# Patient Record
Sex: MALE | Race: WHITE | NOT HISPANIC OR LATINO | Employment: OTHER | URBAN - METROPOLITAN AREA
[De-identification: names, ages, dates, MRNs, and addresses within clinical notes are randomized per-mention and may not be internally consistent; named-entity substitution may affect disease eponyms.]

---

## 2023-09-11 PROBLEM — D89.2 HYPERGAMMAGLOBULINEMIA: Status: ACTIVE | Noted: 2023-09-11

## 2023-09-11 PROBLEM — I10 HYPERTENSION: Status: ACTIVE | Noted: 2023-09-11

## 2023-09-11 PROBLEM — E78.5 HYPERLIPIDEMIA: Status: ACTIVE | Noted: 2023-09-11

## 2023-09-11 PROBLEM — R73.02 IMPAIRED GLUCOSE TOLERANCE: Status: ACTIVE | Noted: 2023-09-11

## 2023-09-11 RX ORDER — BRIMONIDINE TARTRATE AND TIMOLOL MALEATE 2; 5 MG/ML; MG/ML
1 SOLUTION OPHTHALMIC 2 TIMES DAILY
COMMUNITY

## 2023-09-11 RX ORDER — FLUTICASONE PROPIONATE 50 MCG
1 SPRAY, SUSPENSION (ML) NASAL DAILY
COMMUNITY
Start: 2022-05-27 | End: 2024-06-07 | Stop reason: ALTCHOICE

## 2023-09-11 RX ORDER — PRAVASTATIN SODIUM 40 MG/1
1 TABLET ORAL DAILY
COMMUNITY
Start: 2018-08-07 | End: 2024-05-20

## 2023-09-11 RX ORDER — LATANOPROST 50 UG/ML
1 SOLUTION/ DROPS OPHTHALMIC EVERY EVENING
COMMUNITY

## 2023-09-11 RX ORDER — LISINOPRIL 5 MG/1
1 TABLET ORAL DAILY
COMMUNITY
Start: 2018-08-06 | End: 2024-05-20

## 2024-05-18 DIAGNOSIS — I10 HYPERTENSION, UNSPECIFIED TYPE: ICD-10-CM

## 2024-05-18 DIAGNOSIS — E78.49 OTHER HYPERLIPIDEMIA: ICD-10-CM

## 2024-05-20 RX ORDER — PRAVASTATIN SODIUM 40 MG/1
40 TABLET ORAL DAILY
Qty: 90 TABLET | Refills: 3 | Status: SHIPPED | OUTPATIENT
Start: 2024-05-20

## 2024-05-20 RX ORDER — LISINOPRIL 5 MG/1
5 TABLET ORAL DAILY
Qty: 90 TABLET | Refills: 3 | Status: SHIPPED | OUTPATIENT
Start: 2024-05-20

## 2024-06-05 ENCOUNTER — LAB (OUTPATIENT)
Dept: LAB | Facility: LAB | Age: 69
End: 2024-06-05
Payer: MEDICARE

## 2024-06-05 DIAGNOSIS — Z12.5 ENCOUNTER FOR SCREENING FOR MALIGNANT NEOPLASM OF PROSTATE: ICD-10-CM

## 2024-06-05 DIAGNOSIS — R73.9 HYPERGLYCEMIA, UNSPECIFIED: Primary | ICD-10-CM

## 2024-06-05 DIAGNOSIS — I10 ESSENTIAL (PRIMARY) HYPERTENSION: ICD-10-CM

## 2024-06-05 LAB
ALBUMIN SERPL-MCNC: 4.5 G/DL (ref 3.5–5)
ALP BLD-CCNC: 92 U/L (ref 35–125)
ALT SERPL-CCNC: 26 U/L (ref 5–40)
ANION GAP SERPL CALC-SCNC: 9 MMOL/L
AST SERPL-CCNC: 22 U/L (ref 5–40)
BILIRUB SERPL-MCNC: 1.1 MG/DL (ref 0.1–1.2)
BUN SERPL-MCNC: 14 MG/DL (ref 8–25)
CALCIUM SERPL-MCNC: 9.6 MG/DL (ref 8.5–10.4)
CHLORIDE SERPL-SCNC: 99 MMOL/L (ref 97–107)
CO2 SERPL-SCNC: 27 MMOL/L (ref 24–31)
CREAT SERPL-MCNC: 1 MG/DL (ref 0.4–1.6)
EGFRCR SERPLBLD CKD-EPI 2021: 80 ML/MIN/1.73M*2
ERYTHROCYTE [DISTWIDTH] IN BLOOD BY AUTOMATED COUNT: 12.4 % (ref 11.5–14.5)
EST. AVERAGE GLUCOSE BLD GHB EST-MCNC: 128 MG/DL
GLUCOSE SERPL-MCNC: 132 MG/DL (ref 65–99)
HBA1C MFR BLD: 6.1 %
HCT VFR BLD AUTO: 48.3 % (ref 41–52)
HGB BLD-MCNC: 16.1 G/DL (ref 13.5–17.5)
MCH RBC QN AUTO: 31 PG (ref 26–34)
MCHC RBC AUTO-ENTMCNC: 33.3 G/DL (ref 32–36)
MCV RBC AUTO: 93 FL (ref 80–100)
NRBC BLD-RTO: 0 /100 WBCS (ref 0–0)
PLATELET # BLD AUTO: 238 X10*3/UL (ref 150–450)
POTASSIUM SERPL-SCNC: 5.2 MMOL/L (ref 3.4–5.1)
PROT SERPL-MCNC: 7.4 G/DL (ref 5.9–7.9)
PSA SERPL-MCNC: 2.8 NG/ML
RBC # BLD AUTO: 5.19 X10*6/UL (ref 4.5–5.9)
SODIUM SERPL-SCNC: 135 MMOL/L (ref 133–145)
WBC # BLD AUTO: 6.1 X10*3/UL (ref 4.4–11.3)

## 2024-06-05 PROCEDURE — 80053 COMPREHEN METABOLIC PANEL: CPT

## 2024-06-05 PROCEDURE — G0103 PSA SCREENING: HCPCS

## 2024-06-05 PROCEDURE — 85027 COMPLETE CBC AUTOMATED: CPT

## 2024-06-05 PROCEDURE — 83036 HEMOGLOBIN GLYCOSYLATED A1C: CPT

## 2024-06-05 PROCEDURE — 36415 COLL VENOUS BLD VENIPUNCTURE: CPT

## 2024-06-06 NOTE — PROGRESS NOTES
[facility]: [facility]  MEDICARE WELLNESS EXAM      Michi Galindo is a 69 y.o. male that is presenting today for CPE.    Assessment/Plan    Diagnoses and all orders for this visit:  Annual physical exam  Screening for prostate cancer  Encounter for screening for malignant neoplasm of colon  Mixed hyperlipidemia  -     Comprehensive Metabolic Panel; Future  -     Lipid Panel; Future  Primary hypertension  Prediabetes  Encounter for routine laboratory testing  -     Hemoglobin A1C; Future  Other orders  -     Follow Up In Primary Care - Established; Future  We completed the medicare wellness exam today.  The lifestyle is reviewed and recommendations for diet and exercise are made. We reviewed the immunizations and cancer screening and recommendations for needed immunizations and screenings are made.  The patient is independent in all ADL, shows no clinical signs of cognitive impairment, and is not falling or at risk for such.     We updated his chronic doctors and indicated such in the Epic care team section.    Some more specifics in terms of the screenings.  He had a colonoscopy in 2022 with his next one due in 2025.  His PSA was recently normal.  He had Prevnar 20 in 2018 Tdap in 2021 and Shingrix x 2 in 2022.  It would appear that all these things are currently up-to-date.    We also had an opportunity to evaluate/manage his chronic medical problems.  Blood pressure and cholesterol are very well-controlled on his current dose of medications and he will continue those as is.  We discussed his prediabetes today and I am concerned that his sugar is going up to 132 although his A1c is still in the prediabetes range.  We we discussed this in great detail today so as to hopefully prevent him from having further escalations I will plan on seeing him back in 6 months in that regard to recheck on those numbers.    ADVANCED CARE PLANNING  Advanced Care Planning was discussed with patient:  The  patient has an active advanced care plan on file. The patient has an active surrogate decision-maker on file.  Encouraged the patient to confirm that Living Will and Healthcare Power of  (HCPoA) are accurate and up to date.  Encouraged the patient to confirm that our office be provided a copy of any documentation in the event that anything changes.    ACTIVITIES OF DAILY LIVING  Basic ADLs:  Bathing: Independent, Dressing: Independent, Toileting: Independent, Transferring: Independent, Continence: Independent, Feeding: Independent.    Instrumental ADLs:  Ability to use phone: Independent, Shopping: Independent, Cooking: Independent, House-keeping: Independent, Laundry: Independent, Transportation: Independent, Medication Management: Independent, Finance Management: Independent.    Subjective   Here for AWV and follow up of his chronic medical issues- pt feeling very well right now - last seen 1 year ago      Review of Systems   Constitutional: Negative.    HENT: Negative.     Eyes: Negative.    Respiratory: Negative.     Cardiovascular: Negative.    Gastrointestinal: Negative.    Endocrine: Negative.    Genitourinary: Negative.    Musculoskeletal: Negative.    Skin: Negative.    Allergic/Immunologic: Negative.    Neurological: Negative.    Hematological: Negative.    Psychiatric/Behavioral: Negative.     All other systems reviewed and are negative.    Objective   Vitals:    06/07/24 1027   BP: 126/86   Pulse: 72   Temp: 36.1 °C (97 °F)   SpO2: 99%      Body mass index is 32.75 kg/m².  Physical Exam  Vitals and nursing note reviewed.   Constitutional:       General: He is not in acute distress.     Appearance: Normal appearance. He is not ill-appearing.   HENT:      Head: Normocephalic and atraumatic.      Right Ear: Hearing, tympanic membrane, ear canal and external ear normal. There is no impacted cerumen.      Left Ear: Hearing, tympanic membrane, ear canal and external ear normal. There is no impacted  cerumen.      Nose: Nose normal.      Mouth/Throat:      Mouth: Mucous membranes are moist.      Pharynx: Oropharynx is clear. No oropharyngeal exudate or posterior oropharyngeal erythema.   Eyes:      General: No scleral icterus.        Right eye: No discharge.         Left eye: No discharge.      Extraocular Movements: Extraocular movements intact.      Conjunctiva/sclera: Conjunctivae normal.      Pupils: Pupils are equal, round, and reactive to light.   Neck:      Vascular: No carotid bruit.   Cardiovascular:      Rate and Rhythm: Normal rate and regular rhythm.      Pulses: Normal pulses.      Heart sounds: Normal heart sounds. No murmur heard.     No friction rub. No gallop.   Pulmonary:      Effort: Pulmonary effort is normal. No respiratory distress.      Breath sounds: Normal breath sounds.   Abdominal:      General: Abdomen is flat. Bowel sounds are normal.      Palpations: Abdomen is soft.      Tenderness: There is no abdominal tenderness.      Hernia: No hernia is present.   Musculoskeletal:         General: No swelling. Normal range of motion.      Cervical back: Normal range of motion.   Lymphadenopathy:      Cervical: No cervical adenopathy.   Skin:     General: Skin is warm and dry.      Coloration: Skin is not jaundiced.      Findings: No rash.   Neurological:      General: No focal deficit present.      Mental Status: He is alert and oriented to person, place, and time. Mental status is at baseline.   Psychiatric:         Mood and Affect: Mood normal.         Behavior: Behavior normal.       Diagnostic Results   Lab Results   Component Value Date    GLUCOSE 132 (H) 06/05/2024    CALCIUM 9.6 06/05/2024     06/05/2024    K 5.2 (H) 06/05/2024    CO2 27 06/05/2024    CL 99 06/05/2024    BUN 14 06/05/2024    CREATININE 1.00 06/05/2024     Lab Results   Component Value Date    ALT 26 06/05/2024    AST 22 06/05/2024    ALKPHOS 92 06/05/2024    BILITOT 1.1 06/05/2024     Lab Results   Component Value  "Date    WBC 6.1 06/05/2024    HGB 16.1 06/05/2024    HCT 48.3 06/05/2024    MCV 93 06/05/2024     06/05/2024     Lab Results   Component Value Date    CHOL 159 05/25/2023    CHOL 177 05/23/2022    CHOL 170 05/07/2021     Lab Results   Component Value Date    HDL 35 (L) 05/25/2023    HDL 44 05/23/2022    HDL 48 05/07/2021     Lab Results   Component Value Date    LDLCALC 98 05/25/2023    LDLCALC 104 05/23/2022    LDLCALC 89 05/07/2021     Lab Results   Component Value Date    TRIG 129 05/25/2023    TRIG 146 05/23/2022    TRIG 166 (H) 05/07/2021     No components found for: \"CHOLHDL\"  Lab Results   Component Value Date    HGBA1C 6.1 (H) 06/05/2024     Other labs not included in the list above reviewed either before or during this encounter.    History   Past Medical History:   Diagnosis Date    Blindness of right eye     glaucoma    Hyperlipidemia 09/11/2023    Hypertension 09/11/2023     Past Surgical History:   Procedure Laterality Date    ACHILLES TENDON SURGERY Left 2001    CATARACT EXTRACTION Right 1970    COLONOSCOPY  2019    COLONOSCOPY  08/10/2022    SKIN CANCER EXCISION  2018    basal cell     Family History   Problem Relation Name Age of Onset    Heart disease Father      No Known Problems Brother      Other (CVA) Other Grandmother      Social History     Socioeconomic History    Marital status:      Spouse name: Not on file    Number of children: Not on file    Years of education: Not on file    Highest education level: Not on file   Occupational History    Not on file   Tobacco Use    Smoking status: Never     Passive exposure: Never    Smokeless tobacco: Never   Substance and Sexual Activity    Alcohol use: Never    Drug use: Never    Sexual activity: Not on file   Other Topics Concern    Not on file   Social History Narrative    Not on file     Social Determinants of Health     Financial Resource Strain: Not on file   Food Insecurity: Not on file   Transportation Needs: Not on file "   Physical Activity: Not on file   Stress: Not on file   Social Connections: Not on file   Intimate Partner Violence: Not on file   Housing Stability: Not on file     No Known Allergies  Current Outpatient Medications on File Prior to Visit   Medication Sig Dispense Refill    brimonidine-timoloL (Combigan) 0.2-0.5 % ophthalmic solution Administer 1 drop into affected eye(s) 2 times a day.      latanoprost (Xalatan) 0.005 % ophthalmic solution Administer 1 drop into affected eye(s) once daily in the evening.      lisinopril 5 mg tablet TAKE 1 TABLET BY MOUTH EVERY DAY 90 tablet 3    pravastatin (Pravachol) 40 mg tablet TAKE 1 TABLET BY MOUTH EVERY DAY 90 tablet 3    [DISCONTINUED] fluticasone (Flonase) 50 mcg/actuation nasal spray Administer 1 spray into each nostril once daily.       No current facility-administered medications on file prior to visit.     Immunization History   Administered Date(s) Administered    DT (pediatric) 10/02/2010    Flu vaccine, quadrivalent, high-dose, preservative free, age 65y+ (FLUZONE) 10/28/2020    Influenza, injectable, quadrivalent 10/28/2014    Influenza, seasonal, injectable 10/06/2013    Pfizer COVID-19 vaccine, Fall 2023, 12 years and older, (30mcg/0.3mL) 09/20/2023    Pfizer COVID-19 vaccine, bivalent, age 12 years and older (30 mcg/0.3 mL) 10/06/2022    Pfizer Purple Cap SARS-CoV-2 03/27/2021, 04/24/2021, 10/30/2021    Pneumococcal conjugate vaccine, 13-valent (PREVNAR 13) 08/27/2018    Pneumococcal polysaccharide vaccine, 23-valent, age 2 years and older (PNEUMOVAX 23) 05/08/2020    Tdap vaccine, age 7 year and older (BOOSTRIX, ADACEL) 05/14/2021    Zoster vaccine, recombinant, adult (SHINGRIX) 06/02/2022, 08/02/2022     Patient's medical history was reviewed and updated either before or during this encounter.     Johnny Mix MD

## 2024-06-07 ENCOUNTER — OFFICE VISIT (OUTPATIENT)
Dept: PRIMARY CARE | Facility: CLINIC | Age: 69
End: 2024-06-07
Payer: COMMERCIAL

## 2024-06-07 VITALS
TEMPERATURE: 97 F | BODY MASS INDEX: 32.33 KG/M2 | WEIGHT: 206 LBS | SYSTOLIC BLOOD PRESSURE: 126 MMHG | HEART RATE: 72 BPM | DIASTOLIC BLOOD PRESSURE: 86 MMHG | HEIGHT: 67 IN | OXYGEN SATURATION: 99 %

## 2024-06-07 DIAGNOSIS — I10 PRIMARY HYPERTENSION: ICD-10-CM

## 2024-06-07 DIAGNOSIS — Z12.5 SCREENING FOR PROSTATE CANCER: ICD-10-CM

## 2024-06-07 DIAGNOSIS — Z12.11 ENCOUNTER FOR SCREENING FOR MALIGNANT NEOPLASM OF COLON: ICD-10-CM

## 2024-06-07 DIAGNOSIS — E78.2 MIXED HYPERLIPIDEMIA: ICD-10-CM

## 2024-06-07 DIAGNOSIS — Z01.89 ENCOUNTER FOR ROUTINE LABORATORY TESTING: ICD-10-CM

## 2024-06-07 DIAGNOSIS — Z00.00 ANNUAL PHYSICAL EXAM: Primary | ICD-10-CM

## 2024-06-07 DIAGNOSIS — R73.03 PREDIABETES: ICD-10-CM

## 2024-06-07 PROCEDURE — 1123F ACP DISCUSS/DSCN MKR DOCD: CPT | Performed by: INTERNAL MEDICINE

## 2024-06-07 PROCEDURE — G0439 PPPS, SUBSEQ VISIT: HCPCS | Performed by: INTERNAL MEDICINE

## 2024-06-07 PROCEDURE — 1036F TOBACCO NON-USER: CPT | Performed by: INTERNAL MEDICINE

## 2024-06-07 PROCEDURE — 1160F RVW MEDS BY RX/DR IN RCRD: CPT | Performed by: INTERNAL MEDICINE

## 2024-06-07 PROCEDURE — 1158F ADVNC CARE PLAN TLK DOCD: CPT | Performed by: INTERNAL MEDICINE

## 2024-06-07 PROCEDURE — 1126F AMNT PAIN NOTED NONE PRSNT: CPT | Performed by: INTERNAL MEDICINE

## 2024-06-07 PROCEDURE — 3079F DIAST BP 80-89 MM HG: CPT | Performed by: INTERNAL MEDICINE

## 2024-06-07 PROCEDURE — 99215 OFFICE O/P EST HI 40 MIN: CPT | Performed by: INTERNAL MEDICINE

## 2024-06-07 PROCEDURE — 3074F SYST BP LT 130 MM HG: CPT | Performed by: INTERNAL MEDICINE

## 2024-06-07 PROCEDURE — 1159F MED LIST DOCD IN RCRD: CPT | Performed by: INTERNAL MEDICINE

## 2024-06-07 ASSESSMENT — LIFESTYLE VARIABLES
HOW OFTEN DURING THE LAST YEAR HAVE YOU NEEDED AN ALCOHOLIC DRINK FIRST THING IN THE MORNING TO GET YOURSELF GOING AFTER A NIGHT OF HEAVY DRINKING: NEVER
HOW OFTEN DURING THE LAST YEAR HAVE YOU BEEN UNABLE TO REMEMBER WHAT HAPPENED THE NIGHT BEFORE BECAUSE YOU HAD BEEN DRINKING: NEVER
SKIP TO QUESTIONS 9-10: 1
HOW OFTEN DURING THE LAST YEAR HAVE YOU FAILED TO DO WHAT WAS NORMALLY EXPECTED FROM YOU BECAUSE OF DRINKING: NEVER
AUDIT TOTAL SCORE: 0
HOW OFTEN DO YOU HAVE SIX OR MORE DRINKS ON ONE OCCASION: NEVER
HAVE YOU OR SOMEONE ELSE BEEN INJURED AS A RESULT OF YOUR DRINKING: NO
HOW OFTEN DURING THE LAST YEAR HAVE YOU FOUND THAT YOU WERE NOT ABLE TO STOP DRINKING ONCE YOU HAD STARTED: NEVER
HAS A RELATIVE, FRIEND, DOCTOR, OR ANOTHER HEALTH PROFESSIONAL EXPRESSED CONCERN ABOUT YOUR DRINKING OR SUGGESTED YOU CUT DOWN: NO
HOW MANY STANDARD DRINKS CONTAINING ALCOHOL DO YOU HAVE ON A TYPICAL DAY: PATIENT DOES NOT DRINK
HOW OFTEN DURING THE LAST YEAR HAVE YOU HAD A FEELING OF GUILT OR REMORSE AFTER DRINKING: NEVER
HOW OFTEN DO YOU HAVE A DRINK CONTAINING ALCOHOL: NEVER
AUDIT-C TOTAL SCORE: 0

## 2024-06-07 ASSESSMENT — ENCOUNTER SYMPTOMS
OCCASIONAL FEELINGS OF UNSTEADINESS: 0
ALLERGIC/IMMUNOLOGIC NEGATIVE: 1
LOSS OF SENSATION IN FEET: 0
ENDOCRINE NEGATIVE: 1
HEMATOLOGIC/LYMPHATIC NEGATIVE: 1
RESPIRATORY NEGATIVE: 1
GASTROINTESTINAL NEGATIVE: 1
NEUROLOGICAL NEGATIVE: 1
CONSTITUTIONAL NEGATIVE: 1
PSYCHIATRIC NEGATIVE: 1
CARDIOVASCULAR NEGATIVE: 1
DEPRESSION: 0
MUSCULOSKELETAL NEGATIVE: 1
EYES NEGATIVE: 1

## 2024-06-07 ASSESSMENT — PAIN SCALES - GENERAL: PAINLEVEL: 0-NO PAIN

## 2024-06-07 ASSESSMENT — PATIENT HEALTH QUESTIONNAIRE - PHQ9
SUM OF ALL RESPONSES TO PHQ9 QUESTIONS 1 AND 2: 0
2. FEELING DOWN, DEPRESSED OR HOPELESS: NOT AT ALL
1. LITTLE INTEREST OR PLEASURE IN DOING THINGS: NOT AT ALL

## 2024-06-07 NOTE — PATIENT INSTRUCTIONS
"It was nice today to see you to do your annual Medicare wellness exam as well as to evaluate/manage her chronic medical problems.    In terms of your annual wellness exam a few things in your screening schedule that I wanted to be sure that we pointed out.  That your next colonoscopy will be due in 2025.  Your PSA was recently normal and will not be due until this time next year for prostate cancer screening.  We looked at your immunizations as well and you had your pneumonia shot in 2018 and your shingles shots in 2022.  As current guidelines stand those are good for life.  You had a Tdap which is tetanus/whooping cough shot in 2021 and that 1 will next be due in 2031.    In terms of evaluation/management of your chronic medical problems your blood pressure is looking okay today with no changes in medication needed.  Your cholesterol is also looking very good on your current program.  We are concerned about your sugar and you need to work on this so as to not progressed from prediabetes into the diabetes range as we discussed.  I will plan on seeing you back in 6 months with labs prior to your next appointment.    Your blood sugar (glucose) is abnormal , in the \"prediabetes\" range.  Normal is under 100, and we do not usually diagnose diabetes unless your sugar is over 125.  You are in between those values, and we call that prediabetes.  People in this category are at increased risk to develop diabetes.  To reduce that risk, you need to manage your weight, exercise regularly, and minimize your \"carb\" intake (that includes both starches and sweets!).  We will follow this in your normal blood work.    "

## 2024-12-11 ENCOUNTER — LAB (OUTPATIENT)
Dept: LAB | Facility: LAB | Age: 69
End: 2024-12-11
Payer: MEDICARE

## 2024-12-11 DIAGNOSIS — Z01.89 ENCOUNTER FOR ROUTINE LABORATORY TESTING: ICD-10-CM

## 2024-12-11 DIAGNOSIS — E78.2 MIXED HYPERLIPIDEMIA: ICD-10-CM

## 2024-12-11 LAB
ALBUMIN SERPL BCP-MCNC: 4.5 G/DL (ref 3.4–5)
ALP SERPL-CCNC: 61 U/L (ref 33–136)
ALT SERPL W P-5'-P-CCNC: 31 U/L (ref 10–52)
ANION GAP SERPL CALCULATED.3IONS-SCNC: 10 MMOL/L (ref 10–20)
AST SERPL W P-5'-P-CCNC: 25 U/L (ref 9–39)
BILIRUB SERPL-MCNC: 1.1 MG/DL (ref 0–1.2)
BUN SERPL-MCNC: 13 MG/DL (ref 6–23)
CALCIUM SERPL-MCNC: 9.1 MG/DL (ref 8.6–10.3)
CHLORIDE SERPL-SCNC: 103 MMOL/L (ref 98–107)
CHOLEST SERPL-MCNC: 176 MG/DL (ref 0–199)
CHOLEST/HDLC SERPL: 3.8 {RATIO}
CO2 SERPL-SCNC: 30 MMOL/L (ref 21–32)
CREAT SERPL-MCNC: 0.91 MG/DL (ref 0.5–1.3)
EGFRCR SERPLBLD CKD-EPI 2021: 90 ML/MIN/1.73M*2
EST. AVERAGE GLUCOSE BLD GHB EST-MCNC: 120 MG/DL
GLUCOSE SERPL-MCNC: 128 MG/DL (ref 74–99)
HBA1C MFR BLD: 5.8 %
HDLC SERPL-MCNC: 46 MG/DL
LDLC SERPL CALC-MCNC: 98 MG/DL
NON HDL CHOLESTEROL: 130 MG/DL (ref 0–149)
POTASSIUM SERPL-SCNC: 4.2 MMOL/L (ref 3.5–5.3)
PROT SERPL-MCNC: 7 G/DL (ref 6.4–8.2)
SODIUM SERPL-SCNC: 139 MMOL/L (ref 136–145)
TRIGL SERPL-MCNC: 162 MG/DL (ref 0–149)
VLDL: 32 MG/DL (ref 0–40)

## 2024-12-11 PROCEDURE — 83036 HEMOGLOBIN GLYCOSYLATED A1C: CPT

## 2024-12-11 PROCEDURE — 36415 COLL VENOUS BLD VENIPUNCTURE: CPT

## 2024-12-11 PROCEDURE — 80061 LIPID PANEL: CPT

## 2024-12-11 PROCEDURE — 80053 COMPREHEN METABOLIC PANEL: CPT

## 2024-12-13 ENCOUNTER — OFFICE VISIT (OUTPATIENT)
Dept: PRIMARY CARE | Facility: CLINIC | Age: 69
End: 2024-12-13
Payer: MEDICARE

## 2024-12-13 VITALS
SYSTOLIC BLOOD PRESSURE: 130 MMHG | HEIGHT: 67 IN | OXYGEN SATURATION: 92 % | WEIGHT: 200 LBS | HEART RATE: 61 BPM | TEMPERATURE: 96.6 F | BODY MASS INDEX: 31.39 KG/M2 | DIASTOLIC BLOOD PRESSURE: 82 MMHG

## 2024-12-13 DIAGNOSIS — Z12.5 SCREENING FOR PROSTATE CANCER: ICD-10-CM

## 2024-12-13 DIAGNOSIS — E78.2 MIXED HYPERLIPIDEMIA: ICD-10-CM

## 2024-12-13 DIAGNOSIS — I10 PRIMARY HYPERTENSION: Primary | ICD-10-CM

## 2024-12-13 DIAGNOSIS — R73.9 HYPERGLYCEMIA: ICD-10-CM

## 2024-12-13 DIAGNOSIS — E11.9 TYPE 2 DIABETES MELLITUS WITHOUT COMPLICATION, WITHOUT LONG-TERM CURRENT USE OF INSULIN (MULTI): ICD-10-CM

## 2024-12-13 PROBLEM — H35.031: Status: ACTIVE | Noted: 2018-07-10

## 2024-12-13 PROBLEM — H25.012 CORTICAL SENILE CATARACT OF LEFT EYE: Status: ACTIVE | Noted: 2023-05-10

## 2024-12-13 PROBLEM — H40.1122 PRIMARY OPEN ANGLE GLAUCOMA (POAG) OF LEFT EYE, MODERATE STAGE: Status: ACTIVE | Noted: 2017-05-24

## 2024-12-13 PROCEDURE — 3008F BODY MASS INDEX DOCD: CPT | Performed by: INTERNAL MEDICINE

## 2024-12-13 PROCEDURE — 99214 OFFICE O/P EST MOD 30 MIN: CPT | Performed by: INTERNAL MEDICINE

## 2024-12-13 PROCEDURE — 3075F SYST BP GE 130 - 139MM HG: CPT | Performed by: INTERNAL MEDICINE

## 2024-12-13 PROCEDURE — 1159F MED LIST DOCD IN RCRD: CPT | Performed by: INTERNAL MEDICINE

## 2024-12-13 PROCEDURE — G2211 COMPLEX E/M VISIT ADD ON: HCPCS | Performed by: INTERNAL MEDICINE

## 2024-12-13 PROCEDURE — 4010F ACE/ARB THERAPY RXD/TAKEN: CPT | Performed by: INTERNAL MEDICINE

## 2024-12-13 PROCEDURE — 1126F AMNT PAIN NOTED NONE PRSNT: CPT | Performed by: INTERNAL MEDICINE

## 2024-12-13 PROCEDURE — 1160F RVW MEDS BY RX/DR IN RCRD: CPT | Performed by: INTERNAL MEDICINE

## 2024-12-13 PROCEDURE — 3048F LDL-C <100 MG/DL: CPT | Performed by: INTERNAL MEDICINE

## 2024-12-13 PROCEDURE — 3044F HG A1C LEVEL LT 7.0%: CPT | Performed by: INTERNAL MEDICINE

## 2024-12-13 PROCEDURE — 3079F DIAST BP 80-89 MM HG: CPT | Performed by: INTERNAL MEDICINE

## 2024-12-13 RX ORDER — BRIMONIDINE TARTRATE 2 MG/ML
SOLUTION/ DROPS OPHTHALMIC
COMMUNITY
Start: 2024-10-23

## 2024-12-13 ASSESSMENT — PAIN SCALES - GENERAL: PAINLEVEL_OUTOF10: 0-NO PAIN

## 2024-12-13 ASSESSMENT — ENCOUNTER SYMPTOMS
MUSCULOSKELETAL NEGATIVE: 1
RESPIRATORY NEGATIVE: 1
CARDIOVASCULAR NEGATIVE: 1
GASTROINTESTINAL NEGATIVE: 1

## 2024-12-13 NOTE — PATIENT INSTRUCTIONS
As you know we reviewed your blood work and you have crossed the line and you now have the diagnosis of diabetes.  We do not need to add any medication right now but it is very important that you get some diabetic education taken care of.  I made a referral for such.    I look forward to seeing you in 6 months unless you need me sooner

## 2024-12-13 NOTE — PROGRESS NOTES
[facility]: [facility]  PROGRESS NOTE      Michi Galindo is a 69 y.o. male that is presenting today for Follow-up.    Assessment/Plan   Diagnoses and all orders for this visit:  Primary hypertension  -     CBC and Auto Differential; Future  -     Comprehensive Metabolic Panel; Future  Mixed hyperlipidemia  -     Lipid Panel; Future  -     TSH with reflex to Free T4 if abnormal; Future  Type 2 diabetes mellitus without complication, without long-term current use of insulin (Multi)  -     Albumin-Creatinine Ratio, Urine Random; Future  -     Referral to Diabetes Education; Future  Hyperglycemia  -     Hemoglobin A1C; Future  Screening for prostate cancer  -     Prostate Specific Antigen; Future  Other orders  -     Follow Up In Primary Care - Established  -     Follow Up In Primary Care - Medicare Annual; Future  We reviewed his current situation as well as his current diagnoses with the following assessments:  1.  Diabetes-he has actually crossed the line with 2 fasting sugars above 125.  On the good side his hemoglobin A1c is well-controlled.  He is just going to be referred for some diabetic education.  2.  Hypertension-stable on lisinopril  3.  Hyperlipidemia-stable on pravastatin.    Referral to diabetic education follow-up in 6 months  Subjective   He is here for follow-up care today.  At his last visit we were concerned about his prediabetes worsening and we have recommended some diet and lifestyle changes.  He did actually lose a few pounds.  He feels fine      Review of Systems   Respiratory: Negative.     Cardiovascular: Negative.    Gastrointestinal: Negative.    Genitourinary: Negative.    Musculoskeletal: Negative.       Objective   Vitals:    12/13/24 1036   BP: 130/82   Pulse: 61   Temp: 35.9 °C (96.6 °F)   SpO2: 92%      Body mass index is 31.8 kg/m².  Physical Exam  Cardiovascular:      Rate and Rhythm: Normal rate and regular rhythm.      Pulses:           Dorsalis pedis  "pulses are 3+ on the right side and 3+ on the left side.      Heart sounds: Normal heart sounds.   Pulmonary:      Effort: Pulmonary effort is normal.      Breath sounds: Normal breath sounds.   Abdominal:      General: Abdomen is flat. Bowel sounds are normal.      Palpations: Abdomen is soft.   Feet:      Right foot:      Protective Sensation: 5 sites tested.        Skin integrity: Skin integrity normal.      Left foot:      Protective Sensation: 5 sites tested.  5 sites sensed.      Skin integrity: Skin integrity normal.       Diagnostic Results   Lab Results   Component Value Date    GLUCOSE 128 (H) 12/11/2024    CALCIUM 9.1 12/11/2024     12/11/2024    K 4.2 12/11/2024    CO2 30 12/11/2024     12/11/2024    BUN 13 12/11/2024    CREATININE 0.91 12/11/2024     Lab Results   Component Value Date    ALT 31 12/11/2024    AST 25 12/11/2024    ALKPHOS 61 12/11/2024    BILITOT 1.1 12/11/2024     Lab Results   Component Value Date    WBC 6.1 06/05/2024    HGB 16.1 06/05/2024    HCT 48.3 06/05/2024    MCV 93 06/05/2024     06/05/2024     Lab Results   Component Value Date    CHOL 176 12/11/2024    CHOL 159 05/25/2023    CHOL 177 05/23/2022     Lab Results   Component Value Date    HDL 46.0 12/11/2024    HDL 35 (L) 05/25/2023    HDL 44 05/23/2022     Lab Results   Component Value Date    LDLCALC 98 12/11/2024    LDLCALC 98 05/25/2023    LDLCALC 104 05/23/2022     Lab Results   Component Value Date    TRIG 162 (H) 12/11/2024    TRIG 129 05/25/2023    TRIG 146 05/23/2022     No components found for: \"CHOLHDL\"  Lab Results   Component Value Date    HGBA1C 5.8 (H) 12/11/2024     Other labs not included in the list above were reviewed either before or during this encounter.    History    Past Medical History:   Diagnosis Date    Blindness of right eye     glaucoma    Hyperlipidemia 09/11/2023    Hypertension 09/11/2023     Past Surgical History:   Procedure Laterality Date    ACHILLES TENDON SURGERY Left " 2001    CATARACT EXTRACTION Right 1970    COLONOSCOPY  2019    COLONOSCOPY  08/10/2022    SKIN CANCER EXCISION  2018    basal cell     Family History   Problem Relation Name Age of Onset    Heart disease Father      No Known Problems Brother      Other (CVA) Other Grandmother      Social History     Socioeconomic History    Marital status:      Spouse name: Not on file    Number of children: Not on file    Years of education: Not on file    Highest education level: Not on file   Occupational History    Not on file   Tobacco Use    Smoking status: Never     Passive exposure: Never    Smokeless tobacco: Never   Substance and Sexual Activity    Alcohol use: Never    Drug use: Never    Sexual activity: Not on file   Other Topics Concern    Not on file   Social History Narrative    Not on file     Social Drivers of Health     Financial Resource Strain: Not on file   Food Insecurity: Not on file   Transportation Needs: Not on file   Physical Activity: Not on file   Stress: Not on file   Social Connections: Not on file   Intimate Partner Violence: Not on file   Housing Stability: Not on file     No Known Allergies  Current Outpatient Medications on File Prior to Visit   Medication Sig Dispense Refill    brimonidine (AlphaGAN) 0.2 % ophthalmic solution USE 1 DROP IN BOTH EYES EVERY 12 HOURS.      brimonidine-timoloL (Combigan) 0.2-0.5 % ophthalmic solution Administer 1 drop into affected eye(s) 2 times a day.      latanoprost (Xalatan) 0.005 % ophthalmic solution Administer 1 drop into affected eye(s) once daily in the evening.      lisinopril 5 mg tablet TAKE 1 TABLET BY MOUTH EVERY DAY 90 tablet 3    pravastatin (Pravachol) 40 mg tablet TAKE 1 TABLET BY MOUTH EVERY DAY 90 tablet 3     No current facility-administered medications on file prior to visit.     Immunization History   Administered Date(s) Administered    COVID-19, mRNA, LNP-S, PF, 30 mcg/0.3 mL dose 03/31/2021, 10/30/2021    DT (pediatric) 10/02/2010     Flu vaccine, quadrivalent, high-dose, preservative free, age 65y+ (FLUZONE) 10/28/2020, 09/20/2023    Flu vaccine, trivalent, preservative free, HIGH-DOSE, age 65y+ (Fluzone) 12/12/2018, 10/31/2019, 10/07/2024    Flu vaccine, trivalent, preservative free, age 6 months and greater (Fluarix/Fluzone/Flulaval) 01/05/2018    Influenza, Seasonal, Quadrivalent, Adjuvanted 10/30/2021, 10/06/2022    Influenza, injectable, quadrivalent 10/28/2014    Influenza, seasonal, injectable 10/06/2013    Pfizer COVID-19 vaccine, 12 years and older, (30mcg/0.3mL) (Comirnaty) 09/20/2023, 10/07/2024    Pfizer COVID-19 vaccine, bivalent, age 12 years and older (30 mcg/0.3 mL) 10/06/2022    Pfizer Purple Cap SARS-CoV-2 03/27/2021, 04/24/2021    Pneumococcal conjugate vaccine, 13-valent (PREVNAR 13) 08/27/2018    Pneumococcal conjugate vaccine, 20-valent (PREVNAR 20) 09/20/2023    Pneumococcal polysaccharide vaccine, 23-valent, age 2 years and older (PNEUMOVAX 23) 05/08/2020    RESPIRATORY SYNCYTIAL VIRUS (RSV), ELIGIBLE PREGNANT PTS, 0.5 ML (ABRYSVO) 09/20/2023    Tdap vaccine, age 7 year and older (BOOSTRIX, ADACEL) 05/14/2021    Zoster vaccine, recombinant, adult (SHINGRIX) 06/02/2022, 08/02/2022     Patient's medical history was reviewed and updated either before or during this encounter.       Johnny Mix MD

## 2024-12-26 ENCOUNTER — TELEMEDICINE CLINICAL SUPPORT (OUTPATIENT)
Dept: NUTRITION | Facility: HOSPITAL | Age: 69
End: 2024-12-26
Payer: MEDICARE

## 2024-12-26 DIAGNOSIS — E11.9 TYPE 2 DIABETES MELLITUS WITHOUT COMPLICATION, WITHOUT LONG-TERM CURRENT USE OF INSULIN (MULTI): Primary | ICD-10-CM

## 2024-12-26 PROCEDURE — 97802 MEDICAL NUTRITION INDIV IN: CPT

## 2024-12-26 NOTE — PATIENT INSTRUCTIONS
Consistency of meals is important!  Eat 3 meals per day with snacks.  Eat within an hour of waking up and don't go more than 3-4 hours without eating.    Keep carbohydrates consistent from meal to meal.  Aim for 45 g of carbohydrate per meal.  Refer to carbohydrate lists for serving sizes.   Read labels- 15g of total carbohydrate is a serving.    - Choose foods that have fiber listed on the labels.  This helps slow down the release of sugar in your bloodstream.    Include a source of protein with all meals and snack such as meat, fish, nuts, peanut butter, yogurt, cottage cheese or eggs.    -Keep high protein snacks on hand at home- see snack handout.  Try to reduce your weight by 1# per week- this can help lower blood sugar as well.    6.   Drink at least 64 oz of water daily.  7.   Aim for 30 minutes of exercise such as walking on most days. Exercise after meals can help lower blood sugar.

## 2024-12-26 NOTE — PROGRESS NOTES
"Nutrition Assessment     Reason for Visit:  Michi Galindo is a 69 y.o. male who presents for DMT2 and wt loss. He expresses that he does not want to take any more meds. PMH HLD and HTN. Pt states that he is prediabetic and does note have T2DM.    Anthropometrics:  Anthropometrics  IBW/kg (Dietitian Calculated): 64.5 kg  Weight Change  Weight History / % Weight Change: 6 lb wt gain the last 6 months. Wts appear to be quite stable overall  Significant Weight Loss: No    Daily Weight  12/13/24 : 90.7 kg (200 lb)  06/07/24 : 93.4 kg (206 lb)  06/02/23 : 92.5 kg (204 lb)  05/27/22 : 93.9 kg (207 lb)  05/14/21 : 92.5 kg (204 lb)     Lab Results   Component Value Date    HGBA1C 5.8 (H) 12/11/2024    HGBA1C 6.1 (H) 06/05/2024    HGBA1C 5.9 05/25/2023    HGBA1C 6.3 (H) 05/23/2022    HGBA1C 6.0 05/07/2021    HGBA1C 5.6 05/01/2020    HGBA1C 6.0 08/06/2018      Food And Nutrient Intake:  Food and Nutrient History  Energy Intake: Good > 75 %  Fluid Intake: Water, tea (Diet Snapple).  Food Allergy: NKFA  GI Symptoms: none  GI Symptoms greater than 2 weeks: no  Oral Problems: denies  Dentition: own  Sleep Duration/Quality: 7+ hrs disrupted (\"It's been like that since I've retired\")     Food Intake  Amount of Food: \"My trouble is I eat too late.\" \"We don't cook much anymore\"  Meal 1: Toast or english muffin  Meal 2: Often skips  Meal 3: Salad, burger or chicken  Snacks: Only during the holiday season, \"There's too many cookies laying around.\"    Food Preparation  Cooking: Patient (Grills often during warmer months)  Grocery Shopping: Patient  Dining Out: More than 3 times a week  Other: \"The unhealthy ones.\" Bars, grills, [employer], [employer], McDonalds  -Cooks with canola olive oils at home    Bioactive Substance Intake  Pattern of Alcohol Consumption: Occasionally. About 1x/week. Beer    Physical Activities:  Physical Activity  Type of Physical Activity: Golf, walks around the block during the summer. Pt plans to move to [place] " for the winter soon, states he will be able to get more movement in there    Nutrition Focused Physical Exam:  Deferred, phone appt. No malnutrition suspected    Energy Needs  Estimated Energy Needs  Total Energy Estimated Needs (kCal): 1400 kCal  Method for Estimating Needs: -500 kcal of needs for approx 1 lb wt loss/week  Estimated Fluid Needs  Total Fluid Estimated Needs (mL): 2721 mL  Total Fluid Estimated Needs (mL/kg): 30 mL/kg  Estimated Protein Needs  Total Protein Estimated Needs (g):  (65-70)  Method for Estimating Needs: 1.0-1.1 g/kg IBW      Nutrition Diagnosis   Malnutrition Diagnosis  Patient has Malnutrition Diagnosis: No  Nutrition Diagnosis  Patient has Nutrition Diagnosis: Yes  Diagnosis Status (1): New  Nutrition Diagnosis 1: Food and nutrition related knowledge deficit  Related to (1): lack of previous nutrition education  As Evidenced by (1): pt reported typical day's intake      Nutrition Interventions/Recommendations   Food and Nutrition Delivery  Meals & Snacks: Carbohydrate-modified diet, Energy-modified diet, Fiber-modified diet, General Healthful Diet, Protein-modified diet, Modify schedule of foods/fluids, Modify Composition of Meals/Snacks  Nutrition Education  Nutrition Education Content: Content related nutrition education, Education on nutrition's influence on health, Physical activity guidance  Goals: Instructed on counting macronutrient intake, proper portion sizes, and general healthful nutrition. Instructed on benefits of wt loss with diabetes and heart health. Discussed mindful eating, slow gradual wt loss and goals beyond wt. Instructed pt to not skip meals - discussed this may lead to over eating later or snacking more than planned. Instructed on importance of physical activity for wt loss and maintenance of wt loss. Both verbal and written education provided in the one-on-one setting. Pt verbalized understanding of information provided. All questions answered to pt  satisfaction throughout visit    Discussed and provided ADA Plan Your Portions, NCM Planning Fast and Healthy Dinners, and Snack Ideas handouts via email.    Patient Instructions   Consistency of meals is important!  Eat 3 meals per day with snacks.  Eat within an hour of waking up and don't go more than 3-4 hours without eating.    Keep carbohydrates consistent from meal to meal.  Aim for 45 g of carbohydrate per meal.  Refer to carbohydrate lists for serving sizes.   Read labels- 15g of total carbohydrate is a serving.    - Choose foods that have fiber listed on the labels.  This helps slow down the release of sugar in your bloodstream.    Include a source of protein with all meals and snack such as meat, fish, nuts, peanut butter, yogurt, cottage cheese or eggs.    -Keep high protein snacks on hand at home- see snack handout.  Try to reduce your weight by 1# per week- this can help lower blood sugar as well.    6.   Drink at least 64 oz of water daily.  7.   Aim for 30 minutes of exercise such as walking on most days. Exercise after meals can help lower blood sugar.       Nutrition Monitoring and Evaluation   Food/Nutrient Related History Monitoring  Monitoring and Evaluation Plan: Energy intake, Meal/snack pattern, Fiber intake, Carbohydrate intake, Amount of food, Fluid intake, Protein intake  Criteria: 1400 kcal/day  Criteria: aim for at least 64 oz of water daily  Criteria: Aim for 3 meals/day with 1-2 snack  Meal/Snack Pattern: Food variety, Estimated meal and snack pattern  Criteria: Follow plate method when planning meals (1/2 plate non-starchy vegetables, 1/4 plate lean protein, 1/4 plate carbohydrates).  Criteria: Choose lean protein sources including chicken, turkey, seafood, and eggs. Be sure to include protein with each meal and snack  Criteria: Limit added sugar to less than 25 g per day  Criteria: Increase fiber from fruits, vegetables, whole grains, and  beans/lentils  Knowledge/Beliefs/Attitudes  Monitoring and Evaluation Plan: Physical activity  Criteria: Encouraged regular physical activity including strength training as medically appropriate per AHA guidelines  Body Composition/Growth/Weight History  Monitoring and Evaluation Plan: Weight change  Weight Change: Weight change intent  Criteria: 1-2 lb wt loss per week        Time Spent  Prep time on day of patient encounter: 5 minutes  Time spent directly with patient, family or caregiver: 30 minutes ([id number])  Additional Time Spent on Patient Care Activities: 5 minutes  Documentation Time: 15 minutes  Other Time Spent: 0 minutes  Total: 55 minutes      Readiness to Change : Fair  Level of Understanding : Good  Anticipated Compliant : Fair

## 2025-05-13 DIAGNOSIS — E78.49 OTHER HYPERLIPIDEMIA: ICD-10-CM

## 2025-05-13 DIAGNOSIS — I10 HYPERTENSION, UNSPECIFIED TYPE: ICD-10-CM

## 2025-05-13 RX ORDER — PRAVASTATIN SODIUM 40 MG/1
40 TABLET ORAL DAILY
Qty: 90 TABLET | Refills: 3 | Status: SHIPPED | OUTPATIENT
Start: 2025-05-13

## 2025-05-13 RX ORDER — LISINOPRIL 5 MG/1
5 TABLET ORAL DAILY
Qty: 90 TABLET | Refills: 3 | Status: SHIPPED | OUTPATIENT
Start: 2025-05-13

## 2025-06-18 LAB
ALBUMIN SERPL-MCNC: 4.7 G/DL (ref 3.6–5.1)
ALBUMIN/CREAT UR: 4 MG/G CREAT
ALP SERPL-CCNC: 61 U/L (ref 35–144)
ALT SERPL-CCNC: 23 U/L (ref 9–46)
ANION GAP SERPL CALCULATED.4IONS-SCNC: 9 MMOL/L (CALC) (ref 7–17)
AST SERPL-CCNC: 24 U/L (ref 10–35)
BASOPHILS # BLD AUTO: 41 CELLS/UL (ref 0–200)
BASOPHILS NFR BLD AUTO: 0.7 %
BILIRUB SERPL-MCNC: 1 MG/DL (ref 0.2–1.2)
BUN SERPL-MCNC: 10 MG/DL (ref 7–25)
CALCIUM SERPL-MCNC: 9.6 MG/DL (ref 8.6–10.3)
CHLORIDE SERPL-SCNC: 102 MMOL/L (ref 98–110)
CHOLEST SERPL-MCNC: 177 MG/DL
CHOLEST/HDLC SERPL: 3.5 (CALC)
CO2 SERPL-SCNC: 27 MMOL/L (ref 20–32)
CREAT SERPL-MCNC: 0.74 MG/DL (ref 0.7–1.28)
CREAT UR-MCNC: 53 MG/DL (ref 20–320)
EGFRCR SERPLBLD CKD-EPI 2021: 96 ML/MIN/1.73M2
EOSINOPHIL # BLD AUTO: 70 CELLS/UL (ref 15–500)
EOSINOPHIL NFR BLD AUTO: 1.2 %
ERYTHROCYTE [DISTWIDTH] IN BLOOD BY AUTOMATED COUNT: 12.1 % (ref 11–15)
EST. AVERAGE GLUCOSE BLD GHB EST-MCNC: 120 MG/DL
EST. AVERAGE GLUCOSE BLD GHB EST-SCNC: 6.6 MMOL/L
GLUCOSE SERPL-MCNC: 117 MG/DL (ref 65–99)
HBA1C MFR BLD: 5.8 %
HCT VFR BLD AUTO: 47.7 % (ref 38.5–50)
HDLC SERPL-MCNC: 51 MG/DL
HGB BLD-MCNC: 15.7 G/DL (ref 13.2–17.1)
LDLC SERPL CALC-MCNC: 101 MG/DL (CALC)
LYMPHOCYTES # BLD AUTO: 905 CELLS/UL (ref 850–3900)
LYMPHOCYTES NFR BLD AUTO: 15.6 %
MCH RBC QN AUTO: 31.1 PG (ref 27–33)
MCHC RBC AUTO-ENTMCNC: 32.9 G/DL (ref 32–36)
MCV RBC AUTO: 94.5 FL (ref 80–100)
MICROALBUMIN UR-MCNC: 0.2 MG/DL
MONOCYTES # BLD AUTO: 412 CELLS/UL (ref 200–950)
MONOCYTES NFR BLD AUTO: 7.1 %
NEUTROPHILS # BLD AUTO: 4373 CELLS/UL (ref 1500–7800)
NEUTROPHILS NFR BLD AUTO: 75.4 %
NONHDLC SERPL-MCNC: 126 MG/DL (CALC)
PLATELET # BLD AUTO: 237 THOUSAND/UL (ref 140–400)
PMV BLD REES-ECKER: 10.7 FL (ref 7.5–12.5)
POTASSIUM SERPL-SCNC: 4.2 MMOL/L (ref 3.5–5.3)
PROT SERPL-MCNC: 7.2 G/DL (ref 6.1–8.1)
PSA SERPL-MCNC: 3.76 NG/ML
RBC # BLD AUTO: 5.05 MILLION/UL (ref 4.2–5.8)
SODIUM SERPL-SCNC: 138 MMOL/L (ref 135–146)
TRIGL SERPL-MCNC: 151 MG/DL
TSH SERPL-ACNC: 1.29 MIU/L (ref 0.4–4.5)
WBC # BLD AUTO: 5.8 THOUSAND/UL (ref 3.8–10.8)

## 2025-06-20 ENCOUNTER — OFFICE VISIT (OUTPATIENT)
Dept: PRIMARY CARE | Facility: CLINIC | Age: 70
End: 2025-06-20
Payer: MEDICARE

## 2025-06-20 VITALS
DIASTOLIC BLOOD PRESSURE: 84 MMHG | BODY MASS INDEX: 30.76 KG/M2 | HEIGHT: 67 IN | SYSTOLIC BLOOD PRESSURE: 138 MMHG | TEMPERATURE: 97.1 F | WEIGHT: 196 LBS | HEART RATE: 71 BPM | OXYGEN SATURATION: 96 %

## 2025-06-20 DIAGNOSIS — I10 PRIMARY HYPERTENSION: ICD-10-CM

## 2025-06-20 DIAGNOSIS — E11.9 TYPE 2 DIABETES MELLITUS WITHOUT COMPLICATION, WITHOUT LONG-TERM CURRENT USE OF INSULIN: ICD-10-CM

## 2025-06-20 DIAGNOSIS — R73.9 HYPERGLYCEMIA: ICD-10-CM

## 2025-06-20 DIAGNOSIS — Z00.00 ANNUAL PHYSICAL EXAM: Primary | ICD-10-CM

## 2025-06-20 DIAGNOSIS — E78.2 MIXED HYPERLIPIDEMIA: ICD-10-CM

## 2025-06-20 DIAGNOSIS — Z12.11 ENCOUNTER FOR SCREENING FOR MALIGNANT NEOPLASM OF COLON: ICD-10-CM

## 2025-06-20 PROCEDURE — 99213 OFFICE O/P EST LOW 20 MIN: CPT | Mod: 25 | Performed by: INTERNAL MEDICINE

## 2025-06-20 PROCEDURE — 1036F TOBACCO NON-USER: CPT | Performed by: INTERNAL MEDICINE

## 2025-06-20 PROCEDURE — 1159F MED LIST DOCD IN RCRD: CPT | Performed by: INTERNAL MEDICINE

## 2025-06-20 PROCEDURE — 3079F DIAST BP 80-89 MM HG: CPT | Performed by: INTERNAL MEDICINE

## 2025-06-20 PROCEDURE — 1126F AMNT PAIN NOTED NONE PRSNT: CPT | Performed by: INTERNAL MEDICINE

## 2025-06-20 PROCEDURE — 1160F RVW MEDS BY RX/DR IN RCRD: CPT | Performed by: INTERNAL MEDICINE

## 2025-06-20 PROCEDURE — 99213 OFFICE O/P EST LOW 20 MIN: CPT | Performed by: INTERNAL MEDICINE

## 2025-06-20 PROCEDURE — 1124F ACP DISCUSS-NO DSCNMKR DOCD: CPT | Performed by: INTERNAL MEDICINE

## 2025-06-20 PROCEDURE — 3008F BODY MASS INDEX DOCD: CPT | Performed by: INTERNAL MEDICINE

## 2025-06-20 PROCEDURE — 4010F ACE/ARB THERAPY RXD/TAKEN: CPT | Performed by: INTERNAL MEDICINE

## 2025-06-20 PROCEDURE — 3075F SYST BP GE 130 - 139MM HG: CPT | Performed by: INTERNAL MEDICINE

## 2025-06-20 PROCEDURE — 99215 OFFICE O/P EST HI 40 MIN: CPT | Performed by: INTERNAL MEDICINE

## 2025-06-20 PROCEDURE — G0439 PPPS, SUBSEQ VISIT: HCPCS | Performed by: INTERNAL MEDICINE

## 2025-06-20 RX ORDER — ATORVASTATIN CALCIUM 40 MG/1
40 TABLET, FILM COATED ORAL DAILY
Qty: 90 TABLET | Refills: 3 | Status: SHIPPED | OUTPATIENT
Start: 2025-06-20 | End: 2026-06-20

## 2025-06-20 ASSESSMENT — LIFESTYLE VARIABLES
HOW OFTEN DURING THE LAST YEAR HAVE YOU BEEN UNABLE TO REMEMBER WHAT HAPPENED THE NIGHT BEFORE BECAUSE YOU HAD BEEN DRINKING: NEVER
HOW OFTEN DO YOU HAVE SIX OR MORE DRINKS ON ONE OCCASION: NEVER
HOW OFTEN DURING THE LAST YEAR HAVE YOU FOUND THAT YOU WERE NOT ABLE TO STOP DRINKING ONCE YOU HAD STARTED: NEVER
HAVE YOU OR SOMEONE ELSE BEEN INJURED AS A RESULT OF YOUR DRINKING: NO
HOW OFTEN DURING THE LAST YEAR HAVE YOU FAILED TO DO WHAT WAS NORMALLY EXPECTED FROM YOU BECAUSE OF DRINKING: NEVER
SKIP TO QUESTIONS 9-10: 1
AUDIT-C TOTAL SCORE: 0
HAS A RELATIVE, FRIEND, DOCTOR, OR ANOTHER HEALTH PROFESSIONAL EXPRESSED CONCERN ABOUT YOUR DRINKING OR SUGGESTED YOU CUT DOWN: NO
AUDIT TOTAL SCORE: 0
HOW OFTEN DURING THE LAST YEAR HAVE YOU HAD A FEELING OF GUILT OR REMORSE AFTER DRINKING: NEVER
HOW MANY STANDARD DRINKS CONTAINING ALCOHOL DO YOU HAVE ON A TYPICAL DAY: PATIENT DOES NOT DRINK
HOW OFTEN DURING THE LAST YEAR HAVE YOU NEEDED AN ALCOHOLIC DRINK FIRST THING IN THE MORNING TO GET YOURSELF GOING AFTER A NIGHT OF HEAVY DRINKING: NEVER
HOW OFTEN DO YOU HAVE A DRINK CONTAINING ALCOHOL: NEVER

## 2025-06-20 ASSESSMENT — ENCOUNTER SYMPTOMS
EYES NEGATIVE: 1
MUSCULOSKELETAL NEGATIVE: 1
CARDIOVASCULAR NEGATIVE: 1
RESPIRATORY NEGATIVE: 1
OCCASIONAL FEELINGS OF UNSTEADINESS: 0
GASTROINTESTINAL NEGATIVE: 1
DEPRESSION: 0
LOSS OF SENSATION IN FEET: 0
NEUROLOGICAL NEGATIVE: 1
ALLERGIC/IMMUNOLOGIC NEGATIVE: 1
ENDOCRINE NEGATIVE: 1
PSYCHIATRIC NEGATIVE: 1
CONSTITUTIONAL NEGATIVE: 1
HEMATOLOGIC/LYMPHATIC NEGATIVE: 1

## 2025-06-20 ASSESSMENT — PATIENT HEALTH QUESTIONNAIRE - PHQ9
2. FEELING DOWN, DEPRESSED OR HOPELESS: NOT AT ALL
SUM OF ALL RESPONSES TO PHQ9 QUESTIONS 1 AND 2: 0
1. LITTLE INTEREST OR PLEASURE IN DOING THINGS: NOT AT ALL

## 2025-06-20 ASSESSMENT — PAIN SCALES - GENERAL: PAINLEVEL_OUTOF10: 0-NO PAIN

## 2025-06-20 NOTE — PROGRESS NOTES
[facility]: [facility]  MEDICARE WELLNESS EXAM      Michi Galindo is a 70 y.o. male that is presenting today for cpe.    Assessment/Plan    Diagnoses and all orders for this visit:  Annual physical exam  Primary hypertension  -     CBC and Auto Differential; Future  -     Comprehensive Metabolic Panel; Future  Mixed hyperlipidemia  -     atorvastatin (Lipitor) 40 mg tablet; Take 1 tablet (40 mg) by mouth once daily.  -     Lipid Panel; Future  Type 2 diabetes mellitus without complication, without long-term current use of insulin  Hyperglycemia  -     Hemoglobin A1C; Future  Encounter for screening for malignant neoplasm of colon  -     Referral to Gastroenterology; Future  Other orders  -     Follow Up In Primary Care - Medicare Annual  -     Follow Up In Primary Care - Established; Future  We completed the medicare wellness exam today.  The lifestyle is reviewed and recommendations for diet and exercise are made. We reviewed the immunizations and cancer screening and recommendations for needed immunizations and screenings are made.  The patient is independent in all ADL, shows no clinical signs of cognitive impairment, and is not falling or at risk for such.     Also sees the dentist, Dr. Al (glaucoma)    In terms of his annual wellness visit elements:  1.  Advanced directives-he does have power of  forms but they are not yet in his chart I recommended he bring these in for us to include in his medical record  2.  Cancer screenings-he is due for colonoscopy now and will be referred to his GI doctor for such.  He had a PSA in his recent labs which was normal and this will be due again until June 2026  3.  Immunizations-he is done very well in the space and is fully up-to-date namely had Pneumovax in 2023 Tdap in 2021 Shingrix x 2 in 2022 and RSV vaccine in 2023.    Terms of his chronic medical problems we had an opportunity evaluate and manage such.  As part of that  "evaluation we also reviewed the blood work/urine sample that had recently been done.  1.  Diabetes-stable and under good control.  Furthermore urine for microalbumin is negative for any protein.  He did lose 10 pounds in the last year.  He will continue to work on his diet and exercise and we will continue to monitor every 6 months  2.  Hyperlipidemia-we are going to change his pravastatin to atorvastatin to try to get a little better control of his LDL.  3.  Hypertension-stable and well-controlled on current product.    I will plan on seeing him back in 6 months  ADVANCED CARE PLANNING  Advanced Care Planning was discussed with patient:  The patient does not have an advanced care plan on file. The patient does not have an active surrogate decision-maker on file.  Encouraged the patient to confirm that Living Will and Healthcare Power of  (HCPoA) are accurate and up to date.  Encouraged the patient to confirm that our office be provided a copy of any documentation in the event that anything changes.    ACTIVITIES OF DAILY LIVING  Basic ADLs:  Bathing: Independent, Dressing: Independent, Toileting: Independent, Transferring: Independent, Continence: Independent, Feeding: Independent.    Instrumental ADLs:  Ability to use phone: Independent, Shopping: Independent, Cooking: Independent, House-keeping: Independent, Laundry: Independent, Transportation: Independent, Medication Management: Independent, Finance Management: Independent.    Subjective   This 70-year-old is here for his annual wellness visit and follow-up for his chronic medical problems.  The biggest theme here is at the last visit he had \"crossed the line\" and been diagnosed with type 2 diabetes.  He did go to diabetic education and feels like he picked up quite a bit of tips.  He feels that this was balanced by the fact that he was in [place] and so he had to really work on getting his weight back down upon his return.  He really is feeling fine " now with no new medical issues/symptoms to report      Review of Systems   Constitutional: Negative.    HENT: Negative.     Eyes: Negative.    Respiratory: Negative.     Cardiovascular: Negative.    Gastrointestinal: Negative.    Endocrine: Negative.    Genitourinary: Negative.    Musculoskeletal: Negative.    Skin: Negative.    Allergic/Immunologic: Negative.    Neurological: Negative.    Hematological: Negative.    Psychiatric/Behavioral: Negative.     All other systems reviewed and are negative.    Objective   Vitals:    06/20/25 1023   BP: 138/84   Pulse: 71   Temp: 36.2 °C (97.1 °F)   SpO2: 96%      Body mass index is 31.16 kg/m².  Physical Exam  Vitals and nursing note reviewed.   Constitutional:       General: He is not in acute distress.     Appearance: Normal appearance. He is not ill-appearing.   HENT:      Head: Normocephalic and atraumatic.      Right Ear: Hearing, tympanic membrane, ear canal and external ear normal. There is no impacted cerumen.      Left Ear: Hearing, tympanic membrane, ear canal and external ear normal. There is no impacted cerumen.      Nose: Nose normal.      Mouth/Throat:      Mouth: Mucous membranes are moist.      Pharynx: Oropharynx is clear. No oropharyngeal exudate or posterior oropharyngeal erythema.   Eyes:      General: No scleral icterus.        Right eye: No discharge.         Left eye: No discharge.      Extraocular Movements: Extraocular movements intact.      Conjunctiva/sclera: Conjunctivae normal.      Pupils: Pupils are equal, round, and reactive to light.   Neck:      Vascular: No carotid bruit.   Cardiovascular:      Rate and Rhythm: Normal rate and regular rhythm.      Pulses:           Dorsalis pedis pulses are 3+ on the right side and 3+ on the left side.      Heart sounds: Normal heart sounds. No murmur heard.     No friction rub. No gallop.   Pulmonary:      Effort: Pulmonary effort is normal. No respiratory distress.      Breath sounds: Normal breath  sounds.   Abdominal:      General: Abdomen is flat. Bowel sounds are normal.      Palpations: Abdomen is soft.      Tenderness: There is no abdominal tenderness.      Hernia: No hernia is present.   Musculoskeletal:         General: No swelling. Normal range of motion.      Cervical back: Normal range of motion.   Feet:      Right foot:      Protective Sensation: 5 sites tested.  5 sites sensed.      Skin integrity: Skin integrity normal.      Left foot:      Protective Sensation: 5 sites tested.  5 sites sensed.      Skin integrity: Skin integrity normal.   Lymphadenopathy:      Cervical: No cervical adenopathy.   Skin:     General: Skin is warm and dry.      Coloration: Skin is not jaundiced.      Findings: No rash.   Neurological:      General: No focal deficit present.      Mental Status: He is alert and oriented to person, place, and time. Mental status is at baseline.   Psychiatric:         Mood and Affect: Mood normal.         Behavior: Behavior normal.       Diagnostic Results   Lab Results   Component Value Date    GLUCOSE 117 (H) 06/17/2025    CALCIUM 9.6 06/17/2025     06/17/2025    K 4.2 06/17/2025    CO2 27 06/17/2025     06/17/2025    BUN 10 06/17/2025    CREATININE 0.74 06/17/2025     Lab Results   Component Value Date    ALT 23 06/17/2025    AST 24 06/17/2025    ALKPHOS 61 06/17/2025    BILITOT 1.0 06/17/2025     Lab Results   Component Value Date    WBC 5.8 06/17/2025    HGB 15.7 06/17/2025    HCT 47.7 06/17/2025    MCV 94.5 06/17/2025     06/17/2025     Lab Results   Component Value Date    CHOL 177 06/17/2025    CHOL 176 12/11/2024    CHOL 159 05/25/2023     Lab Results   Component Value Date    HDL 51 06/17/2025    HDL 46.0 12/11/2024    HDL 35 (L) 05/25/2023     Lab Results   Component Value Date    LDLCALC 101 (H) 06/17/2025    LDLCALC 98 12/11/2024    LDLCALC 98 05/25/2023     Lab Results   Component Value Date    TRIG 151 (H) 06/17/2025    TRIG 162 (H) 12/11/2024    TRIG  "129 05/25/2023     No components found for: \"CHOLHDL\"  Lab Results   Component Value Date    HGBA1C 5.8 (H) 06/17/2025     Other labs not included in the list above reviewed either before or during this encounter.    History   Medical History[1]  Surgical History[2]  Family History[3]  Social History     Socioeconomic History    Marital status:      Spouse name: Not on file    Number of children: Not on file    Years of education: Not on file    Highest education level: Not on file   Occupational History    Not on file   Tobacco Use    Smoking status: Never     Passive exposure: Never    Smokeless tobacco: Never   Vaping Use    Vaping status: Never Used   Substance and Sexual Activity    Alcohol use: Never    Drug use: Never    Sexual activity: Not on file   Other Topics Concern    Not on file   Social History Narrative    Not on file     Social Drivers of Health     Financial Resource Strain: Not on file   Food Insecurity: Not on file   Transportation Needs: Not on file   Physical Activity: Not on file   Stress: Not on file   Social Connections: Not on file   Intimate Partner Violence: Not on file   Housing Stability: Not on file     Allergies[4]  Medications Ordered Prior to Encounter[5]  Immunization History   Administered Date(s) Administered    COVID-19, mRNA, LNP-S, PF, 30 mcg/0.3 mL dose 03/31/2021, 10/30/2021    DT (pediatric) 10/02/2010    Flu vaccine, quadrivalent, high-dose, preservative free, age 65y+ (FLUZONE) 10/28/2020, 09/20/2023    Flu vaccine, trivalent, preservative free, HIGH-DOSE, age 65y+ (Fluzone) 12/12/2018, 10/31/2019, 10/07/2024    Flu vaccine, trivalent, preservative free, age 6 months and greater (Fluarix/Fluzone/Flulaval) 01/05/2018    Influenza, Seasonal, Quadrivalent, Adjuvanted 10/30/2021, 10/06/2022    Influenza, injectable, quadrivalent 10/28/2014    Influenza, seasonal, injectable 10/06/2013    Pfizer COVID-19 vaccine, 12 years and older, (30mcg/0.3mL) (Comirnaty) " 09/20/2023, 10/07/2024, 04/16/2025    Pfizer COVID-19 vaccine, bivalent, age 12 years and older (30 mcg/0.3 mL) 10/06/2022    Pfizer Purple Cap SARS-CoV-2 03/27/2021, 04/24/2021    Pneumococcal conjugate vaccine, 13-valent (PREVNAR 13) 08/27/2018    Pneumococcal conjugate vaccine, 20-valent (PREVNAR 20) 09/20/2023    Pneumococcal polysaccharide vaccine, 23-valent, age 2 years and older (PNEUMOVAX 23) 05/08/2020    RESPIRATORY SYNCYTIAL VIRUS (RSV), ELIGIBLE PREGNANT PTS, 0.5 ML (ABRYSVO) 09/20/2023    Tdap vaccine, age 7 year and older (BOOSTRIX, ADACEL) 05/14/2021    Zoster vaccine, recombinant, adult (SHINGRIX) 06/02/2022, 08/02/2022     Patient's medical history was reviewed and updated either before or during this encounter.     Johnny Mix MD         [1]   Past Medical History:  Diagnosis Date    Blindness of right eye     glaucoma    Hyperlipidemia 09/11/2023    Hypertension 09/11/2023   [2]   Past Surgical History:  Procedure Laterality Date    ACHILLES TENDON SURGERY Left 2001    CATARACT EXTRACTION Right 1970    COLONOSCOPY  2019    COLONOSCOPY  08/10/2022    SKIN CANCER EXCISION  2018    basal cell   [3]   Family History  Problem Relation Name Age of Onset    Heart disease Father      No Known Problems Brother      Other (CVA) Other Grandmother    [4] No Known Allergies  [5]   Current Outpatient Medications on File Prior to Visit   Medication Sig Dispense Refill    brimonidine (AlphaGAN) 0.2 % ophthalmic solution USE 1 DROP IN BOTH EYES EVERY 12 HOURS.      brimonidine-timoloL (Combigan) 0.2-0.5 % ophthalmic solution Administer 1 drop into affected eye(s) 2 times a day.      latanoprost (Xalatan) 0.005 % ophthalmic solution Administer 1 drop into affected eye(s) once daily in the evening.      lisinopril 5 mg tablet TAKE 1 TABLET BY MOUTH EVERY DAY 90 tablet 3    [DISCONTINUED] pravastatin (Pravachol) 40 mg tablet TAKE 1 TABLET BY MOUTH EVERY DAY 90 tablet 3     No current facility-administered  medications on file prior to visit.

## 2025-06-20 NOTE — PATIENT INSTRUCTIONS
We had a chance to do your wellness visit in follow-up today.  Here is a few the assessments that we discussed:  1.  Advanced directives-as we discussed that we best if you get those power of  forms into your medical record for use when needed  2.  Cancer screenings-looks like you are due for colonoscopy a referral is open in that regard so that you can be seen this summer/fall.  Your PSA was normal in June and will not be due again until next year  3.  Immunizations I am glad to report that you are really fully up-to-date in this space.  Namely you had pneumonia vaccine in 2023 shingles vaccines x 2 in 2022 and RSV vaccine in 2023-you should not need those shots in the future.  Your last Tdap (tetanus and whooping cough) was in 2021 will be due again until 2031  4.  Diabetes-looks like you are doing a very nice job of controlling this without medicines right now.  Remember look cornerstones of management are still diet and exercise.  5.  High blood pressure-stable  6.  High cholesterol-as we discussed we are going to switch the pravastatin out to atorvastatin 40 mg daily and we will see how your numbers fall on this product.  This should be a much more effective product.    I will plan on seeing you back in 6 months